# Patient Record
Sex: MALE | Race: OTHER | NOT HISPANIC OR LATINO | URBAN - METROPOLITAN AREA
[De-identification: names, ages, dates, MRNs, and addresses within clinical notes are randomized per-mention and may not be internally consistent; named-entity substitution may affect disease eponyms.]

---

## 2019-02-09 ENCOUNTER — EMERGENCY (EMERGENCY)
Facility: HOSPITAL | Age: 84
LOS: 1 days | Discharge: HOME | End: 2019-02-09
Admitting: EMERGENCY MEDICINE
Payer: MEDICARE

## 2019-02-09 VITALS
DIASTOLIC BLOOD PRESSURE: 81 MMHG | TEMPERATURE: 98 F | SYSTOLIC BLOOD PRESSURE: 158 MMHG | HEART RATE: 104 BPM | RESPIRATION RATE: 18 BRPM | OXYGEN SATURATION: 91 % | WEIGHT: 199.96 LBS

## 2019-02-09 VITALS
RESPIRATION RATE: 17 BRPM | HEART RATE: 85 BPM | DIASTOLIC BLOOD PRESSURE: 78 MMHG | SYSTOLIC BLOOD PRESSURE: 155 MMHG | OXYGEN SATURATION: 91 %

## 2019-02-09 DIAGNOSIS — Z87.891 PERSONAL HISTORY OF NICOTINE DEPENDENCE: ICD-10-CM
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
## 2019-02-09 DIAGNOSIS — E11.9 TYPE 2 DIABETES MELLITUS WITHOUT COMPLICATIONS: ICD-10-CM

## 2019-02-09 DIAGNOSIS — S90.32XA CONTUSION OF LEFT FOOT, INITIAL ENCOUNTER: ICD-10-CM

## 2019-02-09 DIAGNOSIS — Z79.899 OTHER LONG TERM (CURRENT) DRUG THERAPY: ICD-10-CM

## 2019-02-09 DIAGNOSIS — J44.9 CHRONIC OBSTRUCTIVE PULMONARY DISEASE, UNSPECIFIED: ICD-10-CM

## 2019-02-09 DIAGNOSIS — M79.672 PAIN IN LEFT FOOT: ICD-10-CM
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
## 2019-02-09 DIAGNOSIS — I10 ESSENTIAL (PRIMARY) HYPERTENSION: ICD-10-CM
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
## 2019-02-09 DIAGNOSIS — S90.812A ABRASION, LEFT FOOT, INITIAL ENCOUNTER: ICD-10-CM

## 2019-02-09 DIAGNOSIS — Z79.84 LONG TERM (CURRENT) USE OF ORAL HYPOGLYCEMIC DRUGS: ICD-10-CM

## 2019-02-09 PROCEDURE — 73610 X-RAY EXAM OF ANKLE: CPT | Mod: 26,LT

## 2019-02-09 PROCEDURE — 99283 EMERGENCY DEPT VISIT LOW MDM: CPT | Mod: 25

## 2019-02-09 PROCEDURE — 73630 X-RAY EXAM OF FOOT: CPT | Mod: 26,LT

## 2019-02-09 RX ORDER — FAMCICLOVIR 500 MG/1
0 TABLET, FILM COATED ORAL
Qty: 0 | Refills: 0 | COMMUNITY

## 2019-02-09 RX ORDER — MONTELUKAST 4 MG/1
1 TABLET, CHEWABLE ORAL
Qty: 0 | Refills: 0 | COMMUNITY

## 2019-02-09 RX ORDER — AMLODIPINE BESYLATE 2.5 MG/1
0 TABLET ORAL
Qty: 0 | Refills: 0 | COMMUNITY

## 2019-02-09 RX ORDER — ROSUVASTATIN CALCIUM 5 MG/1
1 TABLET ORAL
Qty: 0 | Refills: 0 | COMMUNITY

## 2019-02-09 RX ORDER — LOSARTAN POTASSIUM 100 MG/1
1 TABLET, FILM COATED ORAL
Qty: 0 | Refills: 0 | COMMUNITY

## 2019-02-09 RX ORDER — METFORMIN HYDROCHLORIDE 850 MG/1
0 TABLET ORAL
Qty: 0 | Refills: 0 | COMMUNITY

## 2019-02-09 NOTE — ED PROVIDER NOTE - CARE PROVIDER_API CALL
Graham Moise)  Orthopaedic Surgery  [address] [place] [address]  Phone: [phone]  Fax: [phone]  Follow Up Time: 7-10 Days

## 2019-02-09 NOTE — ED PROVIDER NOTE - NSFOLLOWUPINSTRUCTIONS_ED_ALL_ED_FT
PLEASE FOLLOW-UP WITH YOUR PRIMARY CARE DOCTOR IN 1-2 DAY FOR FURTHER EVALUATION.  PLEASE TAKE ALL PAPERWORK FROM TODAY'S VISIT TO YOUR PRIMARY DOCTOR.  IF YOU DO NOT HAVE A PRIMARY CARE DOCTOR PLEASE REFER TO THE OFFICE/CLINIC INFORMATION GIVEN ABOVE.  YOU MAY ALSO CALL [phone] AND ASK FOR MS. SANDRA HOWARD.  SHE CAN HELP YOU MAKE A FOLLOW-UP APPOINTMENT.  HER HOURS ARE 11AM-7PM MONDAY - FRIDAY.    PLEASE FOLLOW UP WITH DR. LEUNG (ORTHOPEDIST) IN 1 WEEK IF YOUR PAIN DOES NOT IMPROVE.    PLEASE RETURN TO THE ER IMMEDIATELY OR CALL 911 FOR ANY HIGH FEVER, CHEST PAIN, TROUBLE BREATHING, VOMITING, SEVERE PAIN, OR ANY OTHER CONCERNS

## 2019-02-09 NOTE — ED ADULT TRIAGE NOTE - CHIEF COMPLAINT QUOTE
here for left lower leg/foot pain s/p injury- pt with h/o COPD, htn, high cholesterol- states normal oxygenation is 90-91%

## 2019-02-09 NOTE — ED PROVIDER NOTE - MUSCULOSKELETAL, MLM
Spine appears normal, range of motion is not limited. +Swelling on dorsum of left foot. Tender along the left 4th and 5th metatarsal. Neurovascularly intact.

## 2019-02-09 NOTE — ED PROVIDER NOTE - OBJECTIVE STATEMENT
83 y/o male with PMHx of COPD, HTN, HLD, with NKDA, using cane at baseline presents to the ED with complaints of left foot pain s/p injury. Pt states he was at a warehouse yesterday when the sidebar of a bed fell on his left foot. He developed swelling of the foot. Last night in bed when he tried to turn his left foot, he developed soreness in his toes. Pt also sustained an abrasion to the left foot. He denies taking any pain medications. He is able to ambulate without difficulty or pain. Denies any prior injuries to left foot or blood thinner usage. Denies ankle pain, knee pain, no numbness, no weakness. 85 y/o male with PMHx of COPD, HTN, HLD, with NKDA, using cane at baseline presents to the ED with complaints of left foot pain s/p injury. Pt states he was at a warehouse yesterday when the sidebar of a bed fell on his left foot and subsequently developed swelling to the foot. Last night in bed when he tried to turn his left foot, he developed soreness in his toes. Pt also sustained an abrasion to the left foot. He denies taking any pain medications. He is able to ambulate without difficulty or pain. Denies any prior injuries to left foot or blood thinner usage. Denies ankle pain, knee pain, no numbness, no weakness.

## 2019-02-09 NOTE — ED PROVIDER NOTE - MEDICAL DECISION MAKING DETAILS
No fracture or dislocation on xrays. Abrasion clean/bandaged. Pt ambulates without difficulty. Will D.C with supportive tx instructions. F/U with PMD in 1-2 days and with Ortho in 1 week if no improvement. Strict return precautions reviewed with pt in which pt verbalizes understanding and agrees to.

## 2021-03-09 PROBLEM — E78.5 HYPERLIPIDEMIA, UNSPECIFIED: Chronic | Status: ACTIVE | Noted: 2019-02-09

## 2021-03-09 PROBLEM — J44.9 CHRONIC OBSTRUCTIVE PULMONARY DISEASE, UNSPECIFIED: Chronic | Status: ACTIVE | Noted: 2019-02-09

## 2021-03-09 PROBLEM — I10 ESSENTIAL (PRIMARY) HYPERTENSION: Chronic | Status: ACTIVE | Noted: 2019-02-09

## 2021-03-12 ENCOUNTER — APPOINTMENT (OUTPATIENT)
Dept: OTOLARYNGOLOGY | Facility: CLINIC | Age: 86
End: 2021-03-12
Payer: MEDICARE

## 2021-03-12 VITALS — HEIGHT: 70 IN | WEIGHT: 180 LBS | TEMPERATURE: 97.4 F | BODY MASS INDEX: 25.77 KG/M2

## 2021-03-12 DIAGNOSIS — Z87.09 PERSONAL HISTORY OF OTHER DISEASES OF THE RESPIRATORY SYSTEM: ICD-10-CM

## 2021-03-12 DIAGNOSIS — Z87.891 PERSONAL HISTORY OF NICOTINE DEPENDENCE: ICD-10-CM

## 2021-03-12 DIAGNOSIS — Z86.69 PERSONAL HISTORY OF OTHER DISEASES OF THE NERVOUS SYSTEM AND SENSE ORGANS: ICD-10-CM

## 2021-03-12 PROCEDURE — 92567 TYMPANOMETRY: CPT

## 2021-03-12 PROCEDURE — 31231 NASAL ENDOSCOPY DX: CPT

## 2021-03-12 PROCEDURE — 92557 COMPREHENSIVE HEARING TEST: CPT

## 2021-03-12 PROCEDURE — 99203 OFFICE O/P NEW LOW 30 MIN: CPT | Mod: 25

## 2021-03-12 RX ORDER — IPRATROPIUM BROMIDE 42 UG/1
0.06 SPRAY NASAL 3 TIMES DAILY
Qty: 1 | Refills: 5 | Status: ACTIVE | COMMUNITY
Start: 2021-03-12 | End: 1900-01-01

## 2021-03-12 NOTE — ASSESSMENT
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  The pathogenesis was discussed.  I recommended a trial of Atrovent .06%.\par However, his excess elevation is commonly from reflux. I wanted him to just try to nasal spray first and if that does not help enough I would add an H2 blocker. It is exacerbated by his problematic dentures

## 2021-03-12 NOTE — CONSULT LETTER
[FreeTextEntry2] : GEORGIE KEBEDE\par  [FreeTextEntry1] : \par \par Dear Francisco J\par \par I had the pleasure of seeing your patient today.  \par Please see my note below.\par \par \par Thank you very much for allowing me to participate in the care of your patient.\par \par \par I hope this note finds you and your family  well.   \par \par Sincerely,\par \par Catracho\par \par \par \par Rex Glasgow MD\par [facility]\par [facility]\par  [facility]\par \par \par

## 2021-03-12 NOTE — REASON FOR VISIT
[Initial Evaluation] : an initial evaluation for [FreeTextEntry2] : excessive saliva flowing in saliva

## 2021-03-12 NOTE — PHYSICAL EXAM
[FreeTextEntry1] : General:\par The patient was alert and oriented and in no distress.\par Voice was clear.\par \par Face:\par The patient had no facial asymmetry or mass.\par The skin was unremarkable.\par \par Oral cavity:\par The oral mucosa was normal.\par The oral and base of tongue were clear and without mass.\par The gingival and buccal mucosa were moist and without lesions.\par The palate moved well.\par There was no cleft to the palate.\par There appeared to be good salivary flow.  \par There was no pus, erythema or mass in the oral cavity.\par He had increased saliva and ill fitting dentures\par \par Neck: \par The neck was symmetrical.\par The parotid and submandibular glands were normal without masses.\par The trachea was midline and there was no unusual crepitus.\par The thyroid was smooth and nontender and no masses were palpated.\par There was no significant cervical adenopathy.\par \par Neuro:\par Neurologically, the patient was awake, alert, and oriented to person, place and time. There were no obvious focal neurologic abnormalities.  Cranial nerves II through XII were grossly intact.\par \par Ears:\par The external ears were normal without deformity.\par The ear canals were clear.\par The tympanic membranes were intact and normal. [de-identified] : Audiogram:\par \par Audiometry showed that both ears had normal hearing through the lower speech frequencies with high pitched symmetric sensorineural hearing loss as above that.  Reflexes were intact and the patient had type A tympanograms.  The audiogram was reviewed with the patient.      Nasal endoscopy: \par CPT 88521\par Procedure Note:\par \par Endoscopy was done with Covid precautions and with video. All risks and benefits were discussed with the patient and consent obtained.\par \par Nasal endoscopy was done with topical anesthesia of Pontocaine and Afrin and a      nasal endoscope.\par Indication: Nasal congestion, rule out sinusitis.\par Procedure: The nasal cavity was anesthetized with topical Afrin and Pontocaine. An  endoscope was used and inserted into the nasal cavity.\par Attention was first paid to the anterior nasal cavity.\par Endocoscopy was performed to inspect the interior of the nasal cavity, the nasal septum,  the middle and superior meati, the inferior, middle and superior turbinates, and the spheno-ethmoidal  recesses, the nasopharynx and eustachian tube orifices bilaterally. \par All findings were normal except:\par \par He has a sharp left obstructing septal deflection coming back to the right posteriorly without polyps, purulence or masses.\par \par Flexible fiberoptic laryngoscopy: CPT 64100\par Indications: Dysphagia\par Procedure note:\par \par Flexible fiberoptic laryngoscopy was performed because of dysphagia and because of the patient's inability to tolerate adequate mirror examination.\par \par The nasal cavity was anesthetized with Pontocaine and Afrin.\par The flexible endoscope was placed into the patient's nasal cavity.\par The nasopharynx was without masses.\par The oropharynx, vallecula and base of tongue had no masses.\par The epiglottis, aryepiglottic folds and false vocal cords were normal.\par The pyriform sinuses were without mucosal lesions or pooling of secretions.  \par The laryngeal ventricles were without lesions.\par The visualized subglottis was without mass.\par The lateral and posterior pharyngeal walls were clear and symmetrical.\par The vocal folds were clear and mobile; they abducted and adducted normally.\par There was no interarytenoid mass or fullness.\par \par Endoscopy was done with Covid precautions and with video. All risks and benefits were discussed with the patient and consent obtained.\par He has increased posterior laryngeal inflammation

## 2021-03-12 NOTE — REVIEW OF SYSTEMS
[Post Nasal Drip] : post nasal drip [Throat Clearing] : throat clearing [Negative] : Heme/Lymph [de-identified] : difficulty eating/swallowing

## 2021-03-12 NOTE — HISTORY OF PRESENT ILLNESS
[de-identified] : CARMELINA ROSALES is a 86 year old male who comes in complaining of 5 for 6 weeks of having excess saliva. This is especially after dinner. He notes that saliva can be dripped down his mouth especially on the right. He also complains of a profuse watery nasal discharge. He notes that he never breathe through his nose. He has not had any recent change in his medications other than for starting Flonase which has not helped so far. He feels his hearing is not as good as it used to be.The patient had no other ear nose or throat complaints at this visit.

## 2021-03-19 RX ORDER — FAMOTIDINE 40 MG/1
40 TABLET, FILM COATED ORAL
Qty: 30 | Refills: 0 | Status: ACTIVE | COMMUNITY
Start: 2021-03-19 | End: 1900-01-01

## 2021-03-26 ENCOUNTER — TRANSCRIPTION ENCOUNTER (OUTPATIENT)
Age: 86
End: 2021-03-26

## 2021-04-28 ENCOUNTER — APPOINTMENT (OUTPATIENT)
Dept: OTOLARYNGOLOGY | Facility: CLINIC | Age: 86
End: 2021-04-28
Payer: MEDICARE

## 2021-04-28 VITALS — HEIGHT: 70 IN | BODY MASS INDEX: 25.77 KG/M2 | WEIGHT: 180 LBS | TEMPERATURE: 97.5 F

## 2021-04-28 PROCEDURE — 31231 NASAL ENDOSCOPY DX: CPT

## 2021-04-28 PROCEDURE — 99213 OFFICE O/P EST LOW 20 MIN: CPT | Mod: 25

## 2021-04-28 NOTE — PHYSICAL EXAM
[FreeTextEntry1] : \par The patient was alert and oriented and in no distress.\par Voice was clear.\par \par Face:\par The patient had no facial asymmetry or mass.\par The skin was unremarkable.\par \par Eyes:\par The pupils were equal round and reactive to light and accommodation.\par There was no significant nystagmus or disconjugate gaze noted.\par \par Nose: \par The external nose had no significant deformity.  There was no facial tenderness.  On anterior rhinoscopy, the nasal mucosa was clear.  The anterior septum was midline.  There were no visualized polyps purulence  or masses.\par \par Oral cavity:\par The oral mucosa was normal.\par The oral and base of tongue were clear and without mass.\par The gingival and buccal mucosa were moist and without lesions.\par The palate moved well.\par There was no cleft to the palate.\par There appeared to be good salivary flow.  \par There was no pus, erythema or mass in the oral cavity.\par There was some excess saliva at the commissures bilaterally and he had ill fitting dentures\par \par Ears:\par The external ears were normal without deformity.\par The ear canals were clear.\par The tympanic membranes were intact and normal.\par \par Neck: \par The neck was symmetrical.\par The parotid and submandibular glands were normal without masses.\par The trachea was midline and there was no unusual crepitus.\par The thyroid was smooth and nontender and no masses were palpated.\par There was no significant cervical adenopathy.\par \par \par Neuro:\par Neurologically, the patient was awake, alert, and oriented to person, place and time. There were no obvious focal neurologic abnormalities.  Cranial nerves II through XII were grossly intact.\par \par \par TMJ:\par The temporomandibular joints were nontender.\par There was no abnormal crepitus and no significant malocclusion\par \par  [de-identified] : Nasal endoscopy: \par CPT 58543\par Procedure Note:\par \par Endoscopy was done with Covid precautions and with video. All risks and benefits were discussed with the patient and consent obtained.\par \par Nasal endoscopy was done with topical anesthesia of Pontocaine and Afrin and a      nasal endoscope.\par Indication: Nasal congestion, rule out sinusitis.\par Procedure: The nasal cavity was anesthetized with topical Afrin and Pontocaine. An  endoscope was used and inserted into the nasal cavity.\par Attention was first paid to the anterior nasal cavity.\par Endocoscopy was performed to inspect the interior of the nasal cavity, the nasal septum,  the middle and superior meati, the inferior, middle and superior turbinates, and the spheno-ethmoidal  recesses, the nasopharynx and eustachian tube orifices bilaterally. \par All findings were normal except:\par \par This showed flat watery nasal mucosa with a sharp left septal deflection.\par I was able to visualize the middle meatus though well on the left and there were no polyps or purulence or masses  and there seemed to be adequate space for Clarafix if needed\par

## 2021-04-28 NOTE — ASSESSMENT
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 I. had suggested a trial of Atrovent but he does not believe he got the prescription and I recommended this again.\par I believe the problem with his lips is likely from his poor fitting dentures and I discussed this with him.\par He also could be a candidate for ClariFlex should this be necessary.\par He will return in 6 weeks

## 2021-04-28 NOTE — CONSULT LETTER
[FreeTextEntry2] : GEORGIE KEBEDE\par  [FreeTextEntry1] : \par \par Dear Francisco J\par \par I had the pleasure of seeing your patient today.  \par Please see my note below.\par \par \par Thank you very much for allowing me to participate in the care of your patient.\par \par \par I hope this note finds you and your family  well.   \par \par Sincerely,\par \par Catracho\par \par \par \par Rex Glasgow MD\par [facility]\par [facility]\par  [facility]\par \par \par

## 2021-04-28 NOTE — HISTORY OF PRESENT ILLNESS
[de-identified] : CARMELINA ROSALES Was seen in followup on April 28. He still notes that he gets excess saliva in the corner of his mouth on both sides and a watery nasal discharge as well. He maybe had some improvement with Atrovent He has sleep apnea and uses CPAP.The patient had no other ear nose or throat complaints at this visit.

## 2021-06-09 ENCOUNTER — APPOINTMENT (OUTPATIENT)
Dept: OTOLARYNGOLOGY | Facility: CLINIC | Age: 86
End: 2021-06-09
Payer: MEDICARE

## 2021-06-09 VITALS — TEMPERATURE: 97.8 F | HEIGHT: 70 IN | BODY MASS INDEX: 25.77 KG/M2 | WEIGHT: 180 LBS

## 2021-06-09 DIAGNOSIS — K11.7 DISTURBANCES OF SALIVARY SECRETION: ICD-10-CM

## 2021-06-09 PROCEDURE — 99213 OFFICE O/P EST LOW 20 MIN: CPT

## 2021-06-09 RX ORDER — FLUTICASONE PROPIONATE 50 UG/1
50 SPRAY, METERED NASAL
Qty: 2 | Refills: 5 | Status: ACTIVE | COMMUNITY
Start: 2021-06-09 | End: 1900-01-01

## 2021-06-09 NOTE — CONSULT LETTER
[FreeTextEntry2] : GEORGIE KEBEDE\par  [FreeTextEntry1] : \par \par Dear  Dr. GEORGIE KEBEDE,\par \par I had the pleasure of seeing your patient today.  \par Please see my note below.\par \par \par Thank you very much for allowing me to participate in the care of your patient.\par \par Sincerely,\par \par \par \par Catracho Glasgow\par \par \par Rex Glasgow MD\par [facility]\par [facility]\par  [facility]\par \par

## 2021-06-09 NOTE — HISTORY OF PRESENT ILLNESS
[de-identified] : CARMELINA ROSALES Was seen in followup on June 9. He had his dentures adjusted and was using Flonase and notes that his excess elevation was much improved but not resolved. He had been unable to get Atrovent. He has sleep apnea and uses CPAP successfully.The patient had no other ear nose or throat complaints at this visit.

## 2021-06-09 NOTE — ASSESSMENT
[FreeTextEntry1] : It was my impression that his excess salivation was improved significantly with getting his dentures adjusted. He also finds that Flonase is helping him. If he is able to get the Atrovent he may want to try this as well but otherwise I recommended continuing on his current regimen and would like to reevaluate in 6 months or as needed

## 2021-06-09 NOTE — PHYSICAL EXAM
[FreeTextEntry1] : General:\par The patient was alert and oriented and in no distress.\par Voice was clear.\par \par Face:\par The patient had no facial asymmetry or mass.\par The skin was unremarkable.\par \par Ears:\par The external ears were normal without deformity.\par The ear canals were clear.\par The tympanic membranes were intact and normal.\par \par Oral cavity:\par The oral mucosa was normal.\par The oral and base of tongue were clear and without mass.\par The gingival and buccal mucosa were moist and without lesions.\par The palate moved well.\par There was no cleft to the palate.\par There appeared to be good salivary flow.  \par There was no pus, erythema or mass in the oral cavity.\par His dentures were feeling better and he had less saliva\par \par Nose:\par The nasal mucosa was flat and watery with a moderate S-shaped deflection\par \par Ears:\par The external ears were normal without deformity.\par The ear canals were clear.\par The tympanic membranes were intact and normal.\par \par Neck: \par The neck was symmetrical.\par The parotid and submandibular glands were normal without masses.\par The trachea was midline and there was no unusual crepitus.\par The thyroid was smooth and nontender and no masses were palpated.\par There was no significant cervical adenopathy.

## 2021-11-10 ENCOUNTER — APPOINTMENT (OUTPATIENT)
Dept: OTOLARYNGOLOGY | Facility: CLINIC | Age: 86
End: 2021-11-10
Payer: MEDICARE

## 2021-11-10 VITALS — WEIGHT: 180 LBS | HEIGHT: 70 IN | TEMPERATURE: 97.2 F | BODY MASS INDEX: 25.77 KG/M2

## 2021-11-10 PROCEDURE — 99214 OFFICE O/P EST MOD 30 MIN: CPT | Mod: 25

## 2021-11-10 PROCEDURE — 31231 NASAL ENDOSCOPY DX: CPT

## 2021-11-10 NOTE — CONSULT LETTER
[FreeTextEntry2] : GEORGIE KEBEDE\par  [FreeTextEntry1] : \par \par Dear  Dr. GEORGIE KEBEDE,\par \par I had the pleasure of seeing your patient today.  \par Please see my note below.\par \par \par Thank you very much for allowing me to participate in the care of your patient.\par \par Sincerely,\par \par \par \par Catracho Glasgow\par \par \par Rex Glasgow MD\par [facility]\par [facility]\par  [facility]\par \par

## 2021-11-10 NOTE — PHYSICAL EXAM
[FreeTextEntry1] : General:\par The patient was alert and oriented and in no distress.\par Voice was clear.\par \par Face:\par The patient had no facial asymmetry or mass.\par The skin was unremarkable.\par \par Ears:\par The external ears were normal without deformity.\par The ear canals were clear.\par The tympanic membranes were intact and normal.\par \par Oral cavity:\par The oral mucosa was normal.\par The oral and base of tongue were clear and without mass.\par The gingival and buccal mucosa were moist and without lesions.\par The palate moved well.\par There was no cleft to the palate.\par There appeared to be good salivary flow.  \par There was no pus, erythema or mass in the oral cavity.\par His dentures were feeling better and he had less saliva\par \par Nose:\par The nasal mucosa was flat and watery with a moderate S-shaped deflection\par \par Ears:\par The external ears were normal without deformity.\par The ear canals were clear.\par The tympanic membranes were intact and normal.\par \par Neck: \par The neck was symmetrical.\par The parotid and submandibular glands were normal without masses.\par The trachea was midline and there was no unusual crepitus.\par The thyroid was smooth and nontender and no masses were palpated.\par There was no significant cervical adenopathy. [de-identified] : Nasal endoscopy: \par CPT 55867\par Procedure Note:\par \par Endoscopy was done with Covid precautions and with video. All risks and benefits were discussed with the patient and consent obtained.\par \par Nasal endoscopy was done with topical anesthesia of Pontocaine and Afrin and a      nasal endoscope.\par Indication: Nasal congestion, rule out sinusitis.\par Procedure: The nasal cavity was anesthetized with topical Afrin and Pontocaine. An  endoscope was used and inserted into the nasal cavity.\par Attention was first paid to the anterior nasal cavity.\par Endocoscopy was performed to inspect the interior of the nasal cavity, the nasal septum,  the middle and superior meati, the inferior, middle and superior turbinates, and the spheno-ethmoidal  recesses, the nasopharynx and eustachian tube orifices bilaterally. \par All findings were normal except:\par \par The nasal mucosa is beefy and cautery was an obstructing left septal deflection coming back to the right, there are no polyps, purulence or masses

## 2021-11-10 NOTE — ASSESSMENT
[FreeTextEntry1] : It was my impression that his excess salivation was improved dictating his dentures adjusted but is still quite bothersome. I would be reluctance to use a drying agents in a manifestation this was discussed\par he has a significant vasomotor rhinitis.\par i again suggested a trial of Atrovent and hopefully she will be able to get this prescription.He could be a candidate for cryotherapy if warranted.  He will do something but it would be quite difficult because of his septal deflection.\par he has a significant left septal deflection and he isgoing to be Covid tested before his trip to [place] and I gave him a sheet of paper so he can show to the leonid that explains about his septal deflection

## 2022-01-01 ENCOUNTER — APPOINTMENT (OUTPATIENT)
Dept: OTOLARYNGOLOGY | Facility: CLINIC | Age: 87
End: 2022-01-01

## 2022-01-01 ENCOUNTER — RX RENEWAL (OUTPATIENT)
Age: 87
End: 2022-01-01

## 2022-01-01 VITALS — TEMPERATURE: 97 F | BODY MASS INDEX: 25.77 KG/M2 | WEIGHT: 180 LBS | HEIGHT: 70 IN

## 2022-01-01 VITALS — TEMPERATURE: 97.3 F | WEIGHT: 180 LBS | BODY MASS INDEX: 25.77 KG/M2 | HEIGHT: 70 IN

## 2022-01-01 DIAGNOSIS — J31.0 CHRONIC RHINITIS: ICD-10-CM

## 2022-01-01 DIAGNOSIS — J34.2 DEVIATED NASAL SEPTUM: ICD-10-CM

## 2022-01-01 DIAGNOSIS — J30.0 VASOMOTOR RHINITIS: ICD-10-CM

## 2022-01-01 DIAGNOSIS — H90.5 UNSPECIFIED SENSORINEURAL HEARING LOSS: ICD-10-CM

## 2022-01-01 PROCEDURE — 92557 COMPREHENSIVE HEARING TEST: CPT

## 2022-01-01 PROCEDURE — 99214 OFFICE O/P EST MOD 30 MIN: CPT | Mod: 25

## 2022-01-01 PROCEDURE — 92567 TYMPANOMETRY: CPT

## 2022-01-01 PROCEDURE — 31231 NASAL ENDOSCOPY DX: CPT

## 2022-01-01 PROCEDURE — 99213 OFFICE O/P EST LOW 20 MIN: CPT | Mod: 25

## 2022-01-01 RX ORDER — IPRATROPIUM BROMIDE 42 UG/1
0.06 SPRAY NASAL 3 TIMES DAILY
Qty: 1 | Refills: 5 | Status: COMPLETED | COMMUNITY
Start: 2021-04-28 | End: 2022-01-01

## 2022-01-01 RX ORDER — IPRATROPIUM BROMIDE 42 UG/1
0.06 SPRAY NASAL 3 TIMES DAILY
Qty: 1 | Refills: 5 | Status: COMPLETED | COMMUNITY
Start: 2021-11-10 | End: 2022-01-01

## 2022-01-01 RX ORDER — IPRATROPIUM BROMIDE 42 UG/1
0.06 SPRAY NASAL 3 TIMES DAILY
Qty: 1 | Refills: 5 | Status: COMPLETED | COMMUNITY
Start: 2022-04-20 | End: 2022-01-01

## 2022-01-01 RX ORDER — IPRATROPIUM BROMIDE 42 UG/1
0.06 SPRAY NASAL 3 TIMES DAILY
Qty: 1 | Refills: 5 | Status: ACTIVE | COMMUNITY
Start: 2022-01-01 | End: 1900-01-01

## 2022-04-20 ENCOUNTER — APPOINTMENT (OUTPATIENT)
Dept: OTOLARYNGOLOGY | Facility: CLINIC | Age: 87
End: 2022-04-20
Payer: MEDICARE

## 2022-04-20 VITALS — WEIGHT: 180 LBS | TEMPERATURE: 96 F | HEIGHT: 70 IN | BODY MASS INDEX: 25.77 KG/M2

## 2022-04-20 DIAGNOSIS — H61.22 IMPACTED CERUMEN, LEFT EAR: ICD-10-CM

## 2022-04-20 PROCEDURE — 31231 NASAL ENDOSCOPY DX: CPT

## 2022-04-20 PROCEDURE — 99213 OFFICE O/P EST LOW 20 MIN: CPT | Mod: 25

## 2022-04-20 PROCEDURE — 69210 REMOVE IMPACTED EAR WAX UNI: CPT

## 2022-04-20 NOTE — CONSULT LETTER
[FreeTextEntry2] : GEORGIE KEBEDE\par  [FreeTextEntry1] : \par \par Dear  Dr. GEORGIE KEBEDE,\par \par I had the pleasure of seeing your patient today.  \par Please see my note below.\par \par \par Thank you very much for allowing me to participate in the care of your patient.\par \par Sincerely,\par \par \par Rex Glasgow MD\par [facility]\par [facility]\par  [facility]\par \par

## 2022-04-20 NOTE — PHYSICAL EXAM
[FreeTextEntry1] : \par The patient was alert and oriented and in no distress.\par Voice was clear.\par \par Face:\par The patient had no facial asymmetry or mass.\par The skin was unremarkable.\par \par Eyes:\par The pupils were equal round and reactive to light and accommodation.\par There was no significant nystagmus or disconjugate gaze noted.\par \par Nose: \par The external nose had no significant deformity.  There was no facial tenderness.  On anterior rhinoscopy, the nasal mucosa was clear.  The anterior septum was midline.  There were no visualized polyps purulence  or masses.\par \par Oral cavity:\par The oral mucosa was normal.\par The oral and base of tongue were clear and without mass.\par The gingival and buccal mucosa were moist and without lesions.\par The palate moved well.\par There was no cleft to the palate.\par There appeared to be good salivary flow.  \par There was no pus, erythema or mass in the oral cavity.\par \par \par Ears:\par Procedure note: Debridement of cerumen impaction left ear   67270\par \par Dx:  symptomatic cerumen impaction left ear \par Both external ears were normal.\par There was a dense cerumen impaction in the left ear.  This was cleared microscopically without trauma, using suction and curettes.  Beyond that, both ear canals were clear and both eardrums were intact and mobile.\par Neck: \par The neck was symmetrical.\par The parotid and submandibular glands were normal without masses.\par The trachea was midline and there was no unusual crepitus.\par The thyroid was smooth and nontender and no masses were palpated.\par There was no significant cervical adenopathy.\par \par \par Neuro:\par Neurologically, the patient was awake, alert, and oriented to person, place and time. There were no obvious focal neurologic abnormalities.  Cranial nerves II through XII were grossly intact.\par \par \par TMJ:\par The temporomandibular joints were nontender.\par There was no abnormal crepitus and no significant malocclusion\par \par \par Nasal endoscopy: \par CPT 17750\par Procedure Note:\par \par Endoscopy was done with Covid precautions and with video. All risks and benefits were discussed with the patient and consent obtained.\par \par Nasal endoscopy was done with topical anesthesia of Pontocaine and Afrin and a      nasal endoscope.\par Indication: Nasal congestion, rule out sinusitis.\par Procedure: The nasal cavity was anesthetized with topical Afrin and Pontocaine. An  endoscope was used and inserted into the nasal cavity.\par Attention was first paid to the anterior nasal cavity.\par Endocoscopy was performed to inspect the interior of the nasal cavity, the nasal septum,  the middle and superior meati, the inferior, middle and superior turbinates, and the spheno-ethmoidal  recesses, the nasopharynx and eustachian tube orifices bilaterally. \par All findings were normal except:\par The nasal mucosa was boggy with an obstructing left septal deflection coming back to the right but without polyps purulence or masses.\par

## 2022-04-20 NOTE — ASSESSMENT
[FreeTextEntry1] : It was my impression that his excessive salivation was improved with his new dentures.  I did not recommend other intervention.\par He may have some seasonal component to his rhinitis and I suggested a trial of fluticasone, but suggested ipratropium again for his vasomotor component.\par He has a significant septal deflection.\par He has the sensorineural hearing losses but feels that his hearing is stable.  I did not repeat his hearing test today but would at his next visit.\par I suggested repeat evaluation in 3 months or as needed.\par It was my impression that the patient had a cerumen impaction that was cleared.  I recommended topical moisturizing.  I recommended avoiding Q-tips.  I reviewed aural hygiene and the role of cerumen with the patient

## 2022-04-20 NOTE — HISTORY OF PRESENT ILLNESS
[de-identified] : CARMELINA ROSALES was seen on April 20.  I had last seen him 6 months ago.  He is complaining of the intermittent profuse watery nasal discharge.  He has had some improvement when he uses ipratropium.  However he feels he is also having allergies at this time of year.  He has not noticed any change in his hearing since last year.  The patient had no other ear nose or throat complaints at this visit.

## 2022-07-20 PROBLEM — J30.0 CHRONIC VASOMOTOR RHINITIS: Status: ACTIVE | Noted: 2021-03-12

## 2022-07-20 PROBLEM — H90.5 SENSORINEURAL HEARING LOSS (SNHL): Status: ACTIVE | Noted: 2021-03-12

## 2022-07-20 PROBLEM — J34.2 DEVIATED NASAL SEPTUM: Status: ACTIVE | Noted: 2021-03-12

## 2022-07-20 PROBLEM — J31.0 CHRONIC RHINITIS: Status: ACTIVE | Noted: 2022-04-20

## 2022-07-20 NOTE — HISTORY OF PRESENT ILLNESS
[de-identified] : CARMELINA ROSALES was seen in follow-up on July 20.  He notes that he is doing a lot better with the ipratropium.  Sometimes he uses it 2 or 3 times a day and sometimes not at all.  He has not noticed any acute change in his hearing otalgia or otorrhea.  He has the history of cerumen impactions.  \par The patient had no other ear nose or throat complaints at this visit.

## 2022-07-20 NOTE — ASSESSMENT
[FreeTextEntry1] : It was my impression that he was doing well.  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  I had seen him last visit with a probable allergic rhinitis as well that has resolved.  He has a significant septal deflection and this was discussed but other intervention not recommended.\par His hearing is stable and borderline for hearing aids and he will hold off and I would repeat the hearing test in a year or as needed.\par

## 2022-07-20 NOTE — PHYSICAL EXAM
[FreeTextEntry1] : General:\par The patient was alert and oriented and in no distress.\par Voice was clear.  He looks well and is using a cane for ambulation\par \par Oral cavity:\par The oral mucosa was normal.\par The oral and base of tongue were clear and without mass.\par The gingival and buccal mucosa were moist and without lesions.\par The palate moved well.\par There was no cleft to the palate.\par There appeared to be good salivary flow.  \par There was no pus, erythema or mass in the oral cavity.\par \par Neck: \par The neck was symmetrical.\par The parotid and submandibular glands were normal without masses.\par The trachea was midline and there was no unusual crepitus.\par The thyroid was smooth and nontender and no masses were palpated.\par There was no significant cervical adenopathy.\par \par Nose: \par The external nose had no significant deformity.  There was no facial tenderness.  On anterior rhinoscopy, the nasal mucosa was clear.  The anterior septum was midline.  There were no visualized polyps purulence  or masses.\par \par \par Neuro:\par Neurologically, the patient was awake, alert, and oriented to person, place and time. There were no obvious focal neurologic abnormalities.  Cranial nerves II through XII were grossly intact.\par \par \par Face:\par The patient had no facial asymmetry or mass.\par The skin was unremarkable.\par \par Ears:\par The external ears were normal without deformity.\par The ear canals were clear.\par The tympanic membranes were intact and normal.\par The canals were narrow and a moderate amount of cerumen was removed that was nonobstructing\par \par Nasal endoscopy: \par CPT 19977\par Procedure Note:\par \par Endoscopy was done with Covid precautions and with video. All risks and benefits were discussed with the patient and consent obtained.\par \par Nasal endoscopy was done with topical anesthesia of Pontocaine and Afrin and a      nasal endoscope.\par Indication: Evaluation of response to therapy\par Procedure: The nasal cavity was anesthetized with topical Afrin and Pontocaine. An  endoscope was used and inserted into the nasal cavity.\par Attention was first paid to the anterior nasal cavity.\par Endocoscopy was performed to inspect the interior of the nasal cavity, the nasal septum,  the middle and superior meati, the inferior, middle and superior turbinates, and the spheno-ethmoidal  recesses, the nasopharynx and eustachian tube orifices bilaterally. 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  This was minimally changed from 15 months ago.  I reviewed with the patient and would repeat again in a year depending on the symptoms.

## 2022-07-20 NOTE — CONSULT LETTER
[FreeTextEntry2] : GEORGIE KEBEDE\par  [FreeTextEntry1] : \par \par Dear  Dr. GEORGIE KEBEDE,\par \par I had the pleasure of seeing your patient today.  \par Please see my note below.\par \par \par Thank you very much for allowing me to participate in the care of your patient.\par \par Sincerely,\par \par \par Catracho\par \par Rex Glasgow MD\par [facility]\par [facility]\par  [facility]\par \par

## 2022-12-07 NOTE — HISTORY OF PRESENT ILLNESS
[de-identified] : CARMELINA ROSALES was still complaining of nasal congestion in spite of using antihistamines and fluticasone.  He has a history of recurrent cerumen impactions.  Audiometry 6 months ago showed the sensorineural hearing losses with minimal change over the previous year.  The patient had no other ear nose or throat complaints at this visit.

## 2022-12-07 NOTE — PHYSICAL EXAM
[FreeTextEntry1] : .\par The patient was alert and oriented and in no distress.\par Voice was clear.\par \par Face:\par The patient had no facial asymmetry or mass.\par The skin was unremarkable.\par \par Eyes:\par The pupils were equal round and reactive to light and accommodation.\par There was no significant nystagmus or disconjugate gaze noted.\par \par Nose: \par The external nose had no significant deformity.  There was no facial tenderness.  On anterior rhinoscopy, the nasal mucosa was clear.  The anterior septum was midline.  There were no visualized polyps purulence  or masses.\par \par Oral cavity:\par The oral mucosa was normal.\par The oral and base of tongue were clear and without mass.\par The gingival and buccal mucosa were moist and without lesions.\par The palate moved well.\par There was no cleft to the palate.\par There appeared to be good salivary flow.  \par There was no pus, erythema or mass in the oral cavity.\par \par \par Ears:\par The external ears were normal without deformity.\par The ear canals were clear.\par The tympanic membranes were intact and normal.\par \par Neck: \par The neck was symmetrical.\par The parotid and submandibular glands were normal without masses.\par The trachea was midline and there was no unusual crepitus.\par The thyroid was smooth and nontender and no masses were palpated.\par There was no significant cervical adenopathy.\par \par \par Neuro:\par Neurologically, the patient was awake, alert, and oriented to person, place and time. There were no obvious focal neurologic abnormalities.  Cranial nerves II through XII were grossly intact.\par \par \par TMJ:\par The temporomandibular joints were nontender.\par There was no abnormal crepitus and no significant malocclusion\par \par \par Nasal endoscopy: \par CPT 04967\par Procedure Note:\par \par Endoscopy was done with Covid precautions and with video. All risks and benefits were discussed with the patient and consent obtained.\par \par Nasal endoscopy was done with topical anesthesia of Pontocaine and Afrin and a      nasal endoscope.\par Indication: Nasal congestion, rule out sinusitis.\par Procedure: The nasal cavity was anesthetized with topical Afrin and Pontocaine. An  endoscope was used and inserted into the nasal cavity.\par Attention was first paid to the anterior nasal cavity.\par Endocoscopy was performed to inspect the interior of the nasal cavity, the nasal septum,  the middle and superior meati, the inferior, middle and superior turbinates, and the spheno-ethmoidal  recesses, the nasopharynx and eustachian tube orifices bilaterally. \par All findings were normal except:\par \par The mucosa was quite beefy and congested with a sharp left septal deflection coming back to the right but no polyps, purulence or masses.\par His Cody maneuver was negative.\par

## 2022-12-07 NOTE — ASSESSMENT
[FreeTextEntry1] : It was my impression that he was doing well.  He did not have a recurrent cerumen impaction at this visit.  He has the persistent mucosal congestion and the septal deflection and a negative Ringgold maneuver.  He has a vasomotor rhinitis and in the past he had told me that ipratropium helped.  I suggested using both the fluticasone and the ipratropium and topical moisturizing for the heating season I discussed but would not recommend more aggressive intervention at this point.  I would like to see him back in follow-up in 6 months or as needed.

## 2023-01-01 ENCOUNTER — RX RENEWAL (OUTPATIENT)
Age: 88
End: 2023-01-01

## 2023-01-01 RX ORDER — FLUTICASONE PROPIONATE 50 UG/1
50 SPRAY, METERED NASAL
Qty: 16 | Refills: 5 | Status: ACTIVE | COMMUNITY
Start: 2022-04-20 | End: 1900-01-01

## 2023-06-07 ENCOUNTER — APPOINTMENT (OUTPATIENT)
Dept: OTOLARYNGOLOGY | Facility: CLINIC | Age: 88
End: 2023-06-07